# Patient Record
Sex: FEMALE | URBAN - METROPOLITAN AREA
[De-identification: names, ages, dates, MRNs, and addresses within clinical notes are randomized per-mention and may not be internally consistent; named-entity substitution may affect disease eponyms.]

---

## 2018-03-16 ENCOUNTER — APPOINTMENT (OUTPATIENT)
Age: 38
Setting detail: DERMATOLOGY
End: 2018-03-17

## 2018-03-16 DIAGNOSIS — L71.8 OTHER ROSACEA: ICD-10-CM

## 2018-03-16 PROCEDURE — OTHER PRESCRIPTION: OTHER

## 2018-03-16 PROCEDURE — 99213 OFFICE O/P EST LOW 20 MIN: CPT

## 2018-03-16 RX ORDER — MINOCYCLINE HYDROCHLORIDE 100 MG/1
CAPSULE ORAL QD
Qty: 30 | Refills: 3 | Status: ERX | COMMUNITY
Start: 2018-03-16

## 2018-03-16 RX ORDER — SULFACETAMIDE SODIUM, SULFUR 98; 48 MG/G; MG/G
LIQUID TOPICAL
Qty: 1 | Refills: 11 | Status: ERX | COMMUNITY
Start: 2018-03-16

## 2018-03-16 RX ORDER — IVERMECTIN 10 MG/G
CREAM TOPICAL
Qty: 1 | Refills: 6 | Status: ERX | COMMUNITY
Start: 2018-03-16

## 2018-03-16 ASSESSMENT — LOCATION DETAILED DESCRIPTION DERM
LOCATION DETAILED: RIGHT CENTRAL MALAR CHEEK
LOCATION DETAILED: LEFT CENTRAL MALAR CHEEK

## 2018-03-16 ASSESSMENT — LOCATION SIMPLE DESCRIPTION DERM
LOCATION SIMPLE: RIGHT CHEEK
LOCATION SIMPLE: LEFT CHEEK

## 2018-03-16 ASSESSMENT — LOCATION ZONE DERM: LOCATION ZONE: FACE

## 2018-03-16 NOTE — HPI: RASH (ROSACEA)
How Severe Is Your Rosacea?: moderate
Is This A New Presentation, Or A Follow-Up?: Follow Up Rosacea
Additional History: Pt tried Soolantro samples and noticed improvement

## 2018-08-03 ENCOUNTER — APPOINTMENT (OUTPATIENT)
Age: 38
Setting detail: DERMATOLOGY
End: 2018-08-03

## 2018-08-03 DIAGNOSIS — L71.8 OTHER ROSACEA: ICD-10-CM

## 2018-08-03 DIAGNOSIS — L81.4 OTHER MELANIN HYPERPIGMENTATION: ICD-10-CM

## 2018-08-03 PROCEDURE — 99213 OFFICE O/P EST LOW 20 MIN: CPT

## 2018-08-03 PROCEDURE — OTHER REASSURANCE: OTHER

## 2018-08-03 PROCEDURE — OTHER PRESCRIPTION: OTHER

## 2018-08-03 PROCEDURE — OTHER ADDITIONAL NOTES: OTHER

## 2018-08-03 RX ORDER — MINOCYCLINE HYDROCHLORIDE 100 MG/1
CAPSULE ORAL QD
Qty: 30 | Refills: 6 | Status: ERX

## 2018-08-03 ASSESSMENT — LOCATION ZONE DERM: LOCATION ZONE: TRUNK

## 2018-08-03 ASSESSMENT — LOCATION DETAILED DESCRIPTION DERM: LOCATION DETAILED: RIGHT LATERAL SUPERIOR CHEST

## 2018-08-03 ASSESSMENT — LOCATION SIMPLE DESCRIPTION DERM: LOCATION SIMPLE: CHEST

## 2018-08-03 NOTE — HPI: SKIN LESION
Has Your Skin Lesion Been Treated?: not been treated
Is This A New Presentation, Or A Follow-Up?: Skin Lesion
Additional History: Pt reports “I got a sun burn there this summer”

## 2023-06-08 NOTE — PROCEDURE: REASSURANCE
Prior Authorization Approval    Medication: FLUTICASONE PROPIONATE 50 MCG/ACT NA SUSP  Authorization Effective Date: 1/1/2023  Authorization Expiration Date: 12/31/2023  Approved Dose/Quantity:   Reference #:     Insurance Company: [employer] - Phone [phone] Fax [phone]  Expected CoPay:       CoPay Card Available:      Financial Assistance Needed:   Which Pharmacy is filling the prescription: [id number] - [place], [place] - [facility] [address]  Pharmacy Notified: Yes  Patient Notified: No   **Instructed pharmacy to notify patient when script is ready to /ship.**    
Include Location In Plan?: Yes
Detail Level: Zone